# Patient Record
Sex: FEMALE
[De-identification: names, ages, dates, MRNs, and addresses within clinical notes are randomized per-mention and may not be internally consistent; named-entity substitution may affect disease eponyms.]

---

## 2024-05-14 PROBLEM — Z00.00 ENCOUNTER FOR PREVENTIVE HEALTH EXAMINATION: Status: ACTIVE | Noted: 2024-05-14

## 2024-05-17 ENCOUNTER — APPOINTMENT (OUTPATIENT)
Dept: NEUROLOGY | Facility: CLINIC | Age: 39
End: 2024-05-17
Payer: MEDICAID

## 2024-05-17 VITALS
HEIGHT: 61 IN | OXYGEN SATURATION: 96 % | HEART RATE: 109 BPM | SYSTOLIC BLOOD PRESSURE: 104 MMHG | DIASTOLIC BLOOD PRESSURE: 72 MMHG | BODY MASS INDEX: 31.91 KG/M2 | WEIGHT: 169 LBS | TEMPERATURE: 97.8 F

## 2024-05-17 DIAGNOSIS — G47.00 INSOMNIA, UNSPECIFIED: ICD-10-CM

## 2024-05-17 PROCEDURE — 99204 OFFICE O/P NEW MOD 45 MIN: CPT

## 2024-05-17 RX ORDER — ZOLPIDEM TARTRATE 12.5 MG/1
12.5 TABLET, EXTENDED RELEASE ORAL
Qty: 30 | Refills: 0 | Status: ACTIVE | COMMUNITY
Start: 2024-05-17 | End: 1900-01-01

## 2024-06-04 NOTE — PHYSICAL EXAM
[FreeTextEntry1] : General: Constitutional: Sitting comfortably in NAD Psychiatric: well-groomed, appropriate affect, insight/judgement intact Ears, Nose, Throat: no abnormalities, mucus membranes moist Mallampati: 3 Neck: supple, no lymphadenopathy or nodules palpable Extremities: no edema, clubbing, or cyanosis Skin: no rash or neurocutaneous signs  Cognitive: Orientation, language, memory and knowledge screens intact Cranial Nerves: II: Full to confrontation; disc margins sharp. III/IV/VI: PERRL EOMI, no nystagmus [id number]: Symmetric. VII: Face appears symmetric. VIII: Normal to screening, IX/X: Palate elevates symmetrical. XI: Trapezius symmetric. XII: Tongue midline  Motor: Power: 5/5 throughout Tone: Normal x4 limbs Tremor: none  Sensation: intact to light touch  Coordination/Gait: Finger-nose-finger intact, normal rapid-alternating movements Fine motor normal with normal rapid finger taps and heel tapping Romberg negative  Reflexes: DTR: 2+ symmetric x4 limbs

## 2024-06-04 NOTE — DISCUSSION/SUMMARY
[FreeTextEntry1] : Impression: 1) Insomnia - Ambien helping with sleep initiation, but struggling with sleep maintenance  2) Depression/anxiety   Plan: 1) Increase Ambien to 12.5mg ER. Recommended avoiding alcohol while trying higher dose. If no relief, can try longer acting Eszopiclone  2) Refer to Dr. Schoen for sleep CBT

## 2024-06-04 NOTE — END OF VISIT
[FreeTextEntry3] : I, Karely Escudero, attest that this documentation has been prepared under the direction in and the presence of provider Ricco Corley MD.

## 2024-06-04 NOTE — HISTORY OF PRESENT ILLNESS
[FreeTextEntry1] : Niyah is a 39 year old female with anxiety and depression presenting with insomnia.   Sleep Routine: Retires: 10:00 PM Latency: 30 minutes (with meds and alcohol) Arousals: 4x throughout the night Awakens: 5AM   When she wakes up at 5AM, feels ok. Denies grogginess or brain fog. Unsure if she is truly well rested. Drinks Red Bull throughout the morning. On work days, able to function throughout the day without problem. If she is home, may sleep during the day.  Cyclobenzaprine 10mg x2 at 5:30/6PM. Zolpidem 5mg at 8PM (for the past 2 years). 4-6 shots of tequila nightly since 2022 Tummy tuck in 2022, feeling some discomfort around stitches since. Drinking to help calm the discomfort.   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   Sleep aids failed: Trazodone, Mirtazapine, Melatonin, Advil/Tylenol PM, Ambien 10mg Never tried Zaleplon, Eszopiclone

## 2024-06-18 ENCOUNTER — APPOINTMENT (OUTPATIENT)
Dept: NEUROLOGY | Facility: CLINIC | Age: 39
End: 2024-06-18

## 2024-06-18 NOTE — HISTORY OF PRESENT ILLNESS
[FreeTextEntry1] : 6/18/24 HPI: Niyah is a 39 year old female presenting for a follow up visit for insomnia.  Increased Ambien to 12.5mg ER -  If no relief, Eszopiclone? -    --------------------------- Last seen 5/17/24: Sleep Routine: Retires: 10:00 PM Latency: 30 minutes (with meds and alcohol) Arousals: 4x throughout the night Awakens: 5AM  When she wakes up at 5AM, feels ok. Denies grogginess or brain fog. Unsure if she is truly well rested. Drinks Red Bull throughout the morning. On work days, able to function throughout the day without problem. If she is home, may sleep during the day.  Cyclobenzaprine 10mg x2 at 5:30/6PM. Zolpidem 5mg at 8PM (for the past 2 years). 4-6 shots of tequila nightly since 2022 Tummy tuck in 2022, feeling some discomfort around stitches since. Drinking to help calm the discomfort.  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  Sleep aids failed: Trazodone, Mirtazapine, Melatonin, Advil/Tylenol PM, Ambien 10mg Never tried Zaleplon, Eszopiclone

## 2024-06-18 NOTE — DISCUSSION/SUMMARY
[FreeTextEntry1] : Impression: 1) Insomnia - Ambien helping with sleep initiation, but struggling with sleep maintenance 2) Depression/anxiety  Plan: 1)

## 2024-07-09 ENCOUNTER — APPOINTMENT (OUTPATIENT)
Dept: NEUROLOGY | Facility: CLINIC | Age: 39
End: 2024-07-09
Payer: MEDICAID

## 2024-07-09 ENCOUNTER — NON-APPOINTMENT (OUTPATIENT)
Age: 39
End: 2024-07-09

## 2024-07-09 VITALS
DIASTOLIC BLOOD PRESSURE: 74 MMHG | HEIGHT: 61 IN | SYSTOLIC BLOOD PRESSURE: 107 MMHG | HEART RATE: 77 BPM | OXYGEN SATURATION: 98 %

## 2024-07-09 DIAGNOSIS — G47.00 INSOMNIA, UNSPECIFIED: ICD-10-CM

## 2024-07-09 PROCEDURE — 99214 OFFICE O/P EST MOD 30 MIN: CPT

## 2024-07-09 RX ORDER — CYCLOBENZAPRINE HYDROCHLORIDE 10 MG/1
10 TABLET, FILM COATED ORAL TWICE DAILY
Refills: 0 | Status: ACTIVE | COMMUNITY

## 2024-07-09 RX ORDER — ESZOPICLONE 1 MG/1
1 TABLET, FILM COATED ORAL
Qty: 60 | Refills: 0 | Status: ACTIVE | COMMUNITY
Start: 2024-07-09 | End: 1900-01-01

## 2024-07-09 RX ORDER — MULTIVITAMIN
TABLET ORAL DAILY
Refills: 0 | Status: ACTIVE | COMMUNITY

## 2024-07-25 ENCOUNTER — RX RENEWAL (OUTPATIENT)
Age: 39
End: 2024-07-25

## 2024-09-04 ENCOUNTER — RX RENEWAL (OUTPATIENT)
Age: 39
End: 2024-09-04

## 2024-09-23 ENCOUNTER — APPOINTMENT (OUTPATIENT)
Dept: NEUROLOGY | Facility: CLINIC | Age: 39
End: 2024-09-23

## 2024-09-23 PROCEDURE — 90791 PSYCH DIAGNOSTIC EVALUATION: CPT

## 2024-10-03 ENCOUNTER — RX RENEWAL (OUTPATIENT)
Age: 39
End: 2024-10-03

## 2024-10-23 ENCOUNTER — RX RENEWAL (OUTPATIENT)
Age: 39
End: 2024-10-23

## 2024-11-05 ENCOUNTER — APPOINTMENT (OUTPATIENT)
Dept: NEUROLOGY | Facility: CLINIC | Age: 39
End: 2024-11-05

## 2024-11-18 ENCOUNTER — APPOINTMENT (OUTPATIENT)
Dept: NEUROLOGY | Facility: CLINIC | Age: 39
End: 2024-11-18
Payer: MEDICAID

## 2024-11-18 DIAGNOSIS — G47.00 INSOMNIA, UNSPECIFIED: ICD-10-CM

## 2024-11-18 PROCEDURE — 99214 OFFICE O/P EST MOD 30 MIN: CPT | Mod: 95

## 2024-12-16 ENCOUNTER — APPOINTMENT (OUTPATIENT)
Dept: NEUROLOGY | Facility: CLINIC | Age: 39
End: 2024-12-16

## 2024-12-16 PROCEDURE — 90853 GROUP PSYCHOTHERAPY: CPT

## 2024-12-30 ENCOUNTER — APPOINTMENT (OUTPATIENT)
Dept: NEUROLOGY | Facility: CLINIC | Age: 39
End: 2024-12-30

## 2025-01-06 ENCOUNTER — APPOINTMENT (OUTPATIENT)
Dept: NEUROLOGY | Facility: CLINIC | Age: 40
End: 2025-01-06

## 2025-01-06 ENCOUNTER — RX RENEWAL (OUTPATIENT)
Age: 40
End: 2025-01-06

## 2025-01-06 PROCEDURE — 90853 GROUP PSYCHOTHERAPY: CPT

## 2025-01-13 ENCOUNTER — APPOINTMENT (OUTPATIENT)
Dept: NEUROLOGY | Facility: CLINIC | Age: 40
End: 2025-01-13

## 2025-01-13 PROCEDURE — 90853 GROUP PSYCHOTHERAPY: CPT

## 2025-02-18 ENCOUNTER — RX RENEWAL (OUTPATIENT)
Age: 40
End: 2025-02-18

## 2025-04-08 ENCOUNTER — RX RENEWAL (OUTPATIENT)
Age: 40
End: 2025-04-08